# Patient Record
(demographics unavailable — no encounter records)

---

## 2025-05-12 NOTE — ASSESSMENT
[FreeTextEntry1] : Right lateral foot abscess/cellulitis Verbal consent obtained local anesthesia to area with percutaneous incision and drainage -> risks/benefits discussed 3 cc of 1% lidocaine injected to the area. Small percutaneous incision made with 18gauge needle. Serosanguinous fluid drained and culture sent advised to keep covered for 24 hour then can utilize bandaid to area Advised to monitor cellulitis Patient was started on course of cefadroxil  We discussed going to hospital if erythema extends further than 2cm.  Right foot xrays obtained 3 views; reveal no fracture/dislocation no evidence of foreign body.  He is to follow up within 1 week for close monitoring of cellulitis

## 2025-05-12 NOTE — PHYSICAL EXAM
[General Appearance - Alert] : alert [General Appearance - In No Acute Distress] : in no acute distress [Ankle Swelling On The Right] : of the right ankle [Ankle Swelling (On Exam)] : present [2+] : left foot dorsalis pedis 2+ [FreeTextEntry1] : Erythema and edema localized to the fourth and fifth MTPJ. small area of fluctuance plantar aspect of fourth and fifth metatarsals. No open ulcerations or lesions. No interdigital maceration. there is edema and severe tenderness to palpation forth and fifth toes  [Sensation] : the sensory exam was normal to light touch and pinprick [Vibration Dec.] : normal vibratory sensation at the level of the toes [Oriented To Time, Place, And Person] : oriented to person, place, and time [Impaired Insight] : insight and judgment were intact

## 2025-05-12 NOTE — HISTORY OF PRESENT ILLNESS
[FreeTextEntry1] : 28 year old male comes in for rt foot redness and swollen  he noticed it four days ago itchiness  no pain  can't really stand on it  bump underneath the 4th and pinky toe  no further concerns  denies any trauma to area  states may have been walking barefoot in garden.  Denies any systemic symptomns

## 2025-05-15 NOTE — PHYSICAL EXAM
[General Appearance - Alert] : alert [General Appearance - In No Acute Distress] : in no acute distress [Ankle Swelling (On Exam)] : present [Ankle Swelling On The Right] : of the right ankle [2+] : left foot dorsalis pedis 2+ [FreeTextEntry1] : Erythema and edema localized to the fourth and fifth MTPJ. small area of fluctuance plantar aspect of fourth and fifth metatarsals.  erythema improve and tenderness improving there is now evidence of blistering to the area with serous type drainage erythema is still present [Sensation] : the sensory exam was normal to light touch and pinprick [Vibration Dec.] : normal vibratory sensation at the level of the toes

## 2025-05-15 NOTE — ASSESSMENT
[FreeTextEntry1] : Right foot cellulitis Likely Tinea with superimposed bacterial infection Reviewed previous culture staph and klebsiella There is demarcating blisters to area but overall improvement in erythema I advised betadine dressing changes to area and monitoring of cellultiis, will switch to topical antifungal once infection improves I prescribed a 5 day course of bactrim DS Educated on sign and symptoms of infection including redness, swelling, drainage, worsening pain. Constitutional symptoms such as fever, nausea, vomiting, chills. Advised to call the office immediately if noted educated on dressing change instructions They are to follow up in 1 week for close monitoring advised to call if any issues

## 2025-05-15 NOTE — HISTORY OF PRESENT ILLNESS
[FreeTextEntry1] : 28 year old male patient presents for follow up on right foot pain / cellultis. Patient states he is doing better, but still having slight pain when he stands or walks.  Patient is currently taking cefadroxil  500 mg daily. Patient states his foot is still itchy

## 2025-05-29 NOTE — ASSESSMENT
[FreeTextEntry1] : Assessment: Infection, right foot. Tinea pedis, right fourth interspace.  Plan:   I gave the patient a prescription for ketoconazole cream to be applied twice a day for 6 to 8 weeks to treat the fungal infection and to reduce itching and pain and restore skin integrity.  Patient was instructed to dry the feet thoroughly blotting dry between the toes and using a hairdryer 5 - 10 seconds on each foot.  I advised the patient to notify the office right away if increased redness, swelling, pain, open wounds or discharge were observed.  He will return as needed.

## 2025-05-29 NOTE — PHYSICAL EXAM
[TextEntry] : There is no longer cellulitis on the right foot; however, there is peeling and maceration in the fourth interspace with cracking skin.

## 2025-05-29 NOTE — HISTORY OF PRESENT ILLNESS
[FreeTextEntry1] : Dannie Greer returns stating that he thinks the infection is gone, but there is still a good deal of peeling skin in between the little toes on the right foot.  He denies fever, chills, nausea, vomiting, or signs of infection.  He states the redness has gone down significantly.